# Patient Record
Sex: MALE | URBAN - METROPOLITAN AREA
[De-identification: names, ages, dates, MRNs, and addresses within clinical notes are randomized per-mention and may not be internally consistent; named-entity substitution may affect disease eponyms.]

---

## 2025-02-05 ENCOUNTER — OFFICE VISIT (OUTPATIENT)
Dept: URGENT CARE | Age: 75
End: 2025-02-05
Payer: MEDICARE

## 2025-02-05 VITALS
DIASTOLIC BLOOD PRESSURE: 85 MMHG | RESPIRATION RATE: 16 BRPM | HEART RATE: 49 BPM | TEMPERATURE: 97.9 F | OXYGEN SATURATION: 97 % | SYSTOLIC BLOOD PRESSURE: 149 MMHG

## 2025-02-05 DIAGNOSIS — R05.1 ACUTE COUGH: ICD-10-CM

## 2025-02-05 DIAGNOSIS — Z20.822 SUSPECTED COVID-19 VIRUS INFECTION: ICD-10-CM

## 2025-02-05 DIAGNOSIS — J02.8 PHARYNGITIS DUE TO OTHER ORGANISM: Primary | ICD-10-CM

## 2025-02-05 LAB
POC RAPID INFLUENZA A: NEGATIVE
POC RAPID INFLUENZA B: NEGATIVE
POC SARS-COV-2 AG BINAX: NORMAL

## 2025-02-05 RX ORDER — AZITHROMYCIN 250 MG/1
TABLET, FILM COATED ORAL
Qty: 6 TABLET | Refills: 0 | Status: SHIPPED | OUTPATIENT
Start: 2025-02-05 | End: 2025-02-10

## 2025-02-05 RX ORDER — BENZONATATE 200 MG/1
200 CAPSULE ORAL 3 TIMES DAILY PRN
Qty: 42 CAPSULE | Refills: 0 | Status: SHIPPED | OUTPATIENT
Start: 2025-02-05 | End: 2025-03-07

## 2025-02-05 ASSESSMENT — PATIENT HEALTH QUESTIONNAIRE - PHQ9
2. FEELING DOWN, DEPRESSED OR HOPELESS: NOT AT ALL
1. LITTLE INTEREST OR PLEASURE IN DOING THINGS: NOT AT ALL
SUM OF ALL RESPONSES TO PHQ9 QUESTIONS 1 AND 2: 0

## 2025-02-05 ASSESSMENT — ENCOUNTER SYMPTOMS
DEPRESSION: 0
OCCASIONAL FEELINGS OF UNSTEADINESS: 0
LOSS OF SENSATION IN FEET: 0

## 2025-02-05 NOTE — PROGRESS NOTES
Subjective   Patient ID: Vinay Parekh is a 74 y.o. male. They present today with a chief complaint of Cough (C/O cough for the last three days. ).    History of Present Illness  75 yo male coming in for sore throat and cough for the last 5 days. He states he has been in the hospital with his wife who is having all kinds of tests done and believes he may have picked something up there. He denies any fevers or chills. HE denies any congestion.     Past Medical History  Allergies as of 02/05/2025    (No Known Allergies)       (Not in a hospital admission)       History reviewed. No pertinent past medical history.    Past Surgical History:   Procedure Laterality Date    OTHER SURGICAL HISTORY  08/20/2015    Abdominal Surgery        reports that he has never smoked. He has never used smokeless tobacco.    Review of Systems  Review of Systems:  General: No weight loss, fatigue, anorexia, insomnia, fever, chills.  ENT: Positive pharyngitis, no dry mouth, nasal congestion, ear pain  Cardiac: No chest pain, palpitations, syncope, near syncope.  Pulmonary:  No shortness of breath, positive cough, no hemoptysis  Heme/lymph: No swollen glands, fever, bleeding  GI: No abdominal pain, change in bowel habits, melena, hematemesis, hematochezia, nausea, vomiting, or diarrhea  : No discharge, dysuria, frequency, urgency, hematuria  Skin: No rashes                               Objective    Vitals:    02/05/25 1701   BP: 149/85   BP Location: Left arm   Patient Position: Sitting   BP Cuff Size: Adult   Pulse: (!) 49   Resp: 16   Temp: 36.6 °C (97.9 °F)   TempSrc: Oral   SpO2: 97%     No LMP for male patient.    Physical Exam  Physical Exam:  General: Vital noted, no distress. Afebrile  EENT: Eyes unremarkable, Pupils PERRLA, EOMs intact. TMs unremarkable. Posterior oropharynx with erythema and exudate. Uvula in the midline and non-edematous. No PTA. No retropharyngeal mass. No Dany's angina.  Neck: Supple. No meningismus through  full range of motion. No lymphadenopathy.  Cardiac: Regular rate and rhythm, no murmur  Pulmonary: Lungs clear bilaterally with good aeration. No adventitious breath sounds.      Procedures    Point of Care Test & Imaging Results from this visit  Results for orders placed or performed in visit on 02/05/25   POCT Covid-19 Rapid Antigen   Result Value Ref Range    POC LINDSAY-COV-2 AG  Presumptive negative test for SARS-CoV-2 (no antigen detected)     Presumptive negative test for SARS-CoV-2 (no antigen detected)   POCT Influenza A/B manually resulted   Result Value Ref Range    POC Rapid Influenza A Negative Negative    POC Rapid Influenza B Negative Negative      No results found.    Diagnostic study results (if any) were reviewed by MARTINE Castro.    Assessment/Plan   Allergies, medications, history, and pertinent labs/EKGs/Imaging reviewed by MARTINE Castro.     Medical Decision Making  Testing: rapid covid and flu  Treatment: zithromax and tessalon prescribed  Differential: 1) bronchitis , 2) pharyngitis , 3) covid  Plan: Patient will follow up with the PCP in the next 2-3 days. Return for any worsening symptoms or go to the ER for further evaluation. Patient understands return precautions and discharge insturctions.  Impression:   1) pharyngitis      Orders and Diagnoses  Diagnoses and all orders for this visit:  Pharyngitis due to other organism  -     azithromycin (Zithromax) 250 mg tablet; Take 2 tabs (500 mg) by mouth today, than 1 daily for 4 days.  -     benzonatate (Tessalon) 200 mg capsule; Take 1 capsule (200 mg) by mouth 3 times a day as needed for cough. Do not crush or chew.  Suspected COVID-19 virus infection  -     POCT Covid-19 Rapid Antigen  Acute cough  -     POCT Influenza A/B manually resulted      Medical Admin Record      Patient disposition: Home    Electronically signed by MARTINE Castro  5:30 PM

## 2025-02-11 ENCOUNTER — OFFICE VISIT (OUTPATIENT)
Dept: URGENT CARE | Age: 75
End: 2025-02-11
Payer: MEDICARE

## 2025-02-11 VITALS
TEMPERATURE: 97.9 F | DIASTOLIC BLOOD PRESSURE: 81 MMHG | HEART RATE: 78 BPM | WEIGHT: 185 LBS | OXYGEN SATURATION: 95 % | RESPIRATION RATE: 15 BRPM | SYSTOLIC BLOOD PRESSURE: 124 MMHG

## 2025-02-11 DIAGNOSIS — J40 BRONCHITIS: Primary | ICD-10-CM

## 2025-02-11 RX ORDER — DOXYCYCLINE 100 MG/1
100 CAPSULE ORAL 2 TIMES DAILY
Qty: 14 CAPSULE | Refills: 0 | Status: SHIPPED | OUTPATIENT
Start: 2025-02-11 | End: 2025-02-18

## 2025-02-11 ASSESSMENT — PAIN SCALES - GENERAL: PAINLEVEL_OUTOF10: 0-NO PAIN

## 2025-02-11 NOTE — PROGRESS NOTES
Subjective   Patient ID: Vinay Parekh is a 74 y.o. male. They present today with a chief complaint of Cough (Perisitant with mucus x 1-2 weeks. Seen last week and given meds but pt does not feel they are affective.).    History of Present Illness  73 yo male coming in for cough with yellow/green mucous. HE states he was feeling a little better after taking the Zithromax but then the cough came back again and is now giving him a colored phlegm. He denies any fevers or chills. He denies any shortness of breath.     Past Medical History  Allergies as of 02/11/2025    (No Known Allergies)       (Not in a hospital admission)       No past medical history on file.    Past Surgical History:   Procedure Laterality Date    OTHER SURGICAL HISTORY  08/20/2015    Abdominal Surgery        reports that he has never smoked. He has never used smokeless tobacco.    Review of Systems  Review of Systems:  General: No weight loss, fatigue, anorexia, insomnia, fever, chills.  ENT: No pharyngitis, dry mouth, nasal congestion, ear pain  Cardiac: No chest pain, palpitations, syncope, near syncope.  Pulmonary:  No shortness of breath, positive cough, no hemoptysis  Heme/lymph: No swollen glands, fever, bleeding  Musculoskeletal: No limb pain, joint pain, joint swelling.  Skin: No rashes  Neuro: No numbness, tingling, headaches                                 Objective    Vitals:    02/11/25 0857   BP: 124/81   Pulse: 78   Resp: 15   Temp: 36.6 °C (97.9 °F)   TempSrc: Oral   SpO2: 95%   Weight: 83.9 kg (185 lb)     No LMP for male patient.    Physical Exam  Physical Exam:  General: Vital noted, no distress. Afebrile  EENT: Eyes unremarkable, Pupils PERRLA, EOMs intact. TMs unremarkable. Posterior oropharynx unremarkable. Uvula in the midline and non-edematous. No PTA. No retropharyngeal mass. No Dany's angina.  Cardiac: Regular rate and rhythm, no murmur  Pulmonary: Lungs with diminished breath sounds in the left lower lobe, remainder of  lung fields clear bilaterally with good aeration. No adventitious breath sounds.  Skin: No rashes      Procedures    Point of Care Test & Imaging Results from this visit  No results found for this visit on 02/11/25.   No results found.    Diagnostic study results (if any) were reviewed by [facility].    Assessment/Plan   Allergies, medications, history, and pertinent labs/EKGs/Imaging reviewed by NGOZI Castro-CNP.     Medical Decision Making  Treatment: Doxycycline prescribed  Differential: 1) bronchitis , 2) pneumonia, 3) uri  Plan: Patient will follow up with the PCP in the next 2-3 days. Return for any worsening symptoms or go to the ER for further evaluation. Patient understands return precautions and discharge insturctions.  Impression:   1) bronchitis      Orders and Diagnoses  There are no diagnoses linked to this encounter.    Medical Admin Record      Patient disposition: Home    Electronically signed by [facility]  8:59 AM

## 2025-04-25 ENCOUNTER — OFFICE VISIT (OUTPATIENT)
Dept: URGENT CARE | Age: 75
End: 2025-04-25
Payer: MEDICARE

## 2025-04-25 VITALS
SYSTOLIC BLOOD PRESSURE: 148 MMHG | OXYGEN SATURATION: 92 % | TEMPERATURE: 96.1 F | DIASTOLIC BLOOD PRESSURE: 85 MMHG | HEART RATE: 57 BPM

## 2025-04-25 DIAGNOSIS — J40 BRONCHITIS: Primary | ICD-10-CM

## 2025-04-25 DIAGNOSIS — J02.9 SORE THROAT: ICD-10-CM

## 2025-04-25 LAB
POC HUMAN RHINOVIRUS PCR: NEGATIVE
POC INFLUENZA A VIRUS PCR: NEGATIVE
POC INFLUENZA B VIRUS PCR: NEGATIVE
POC RESPIRATORY SYNCYTIAL VIRUS PCR: NEGATIVE
POC STREPTOCOCCUS PYOGENES (GROUP A STREP) PCR: NEGATIVE

## 2025-04-25 RX ORDER — AMOXICILLIN 875 MG/1
875 TABLET, FILM COATED ORAL 2 TIMES DAILY
Qty: 14 TABLET | Refills: 0 | Status: SHIPPED | OUTPATIENT
Start: 2025-04-25 | End: 2025-05-02

## 2025-04-25 RX ORDER — BENZONATATE 200 MG/1
200 CAPSULE ORAL 3 TIMES DAILY PRN
Qty: 42 CAPSULE | Refills: 0 | Status: SHIPPED | OUTPATIENT
Start: 2025-04-25 | End: 2025-05-25

## 2025-04-25 RX ORDER — ALBUTEROL SULFATE 90 UG/1
2 INHALANT RESPIRATORY (INHALATION) EVERY 4 HOURS PRN
Qty: 8.5 G | Refills: 0 | Status: SHIPPED | OUTPATIENT
Start: 2025-04-25 | End: 2026-04-25

## 2025-04-25 NOTE — PROGRESS NOTES
Subjective   Patient ID: Vinay Parekh is a 75 y.o. male. They present today with a chief complaint of Sore Throat and Cough (Pt states, can't cough up any stuff.).    History of Present Illness  76 yo male coming in for cough and sore throat for the last few days. He states at night the cough is worse. He denies any fevers or chills. He denies any shortness of breath. He states at night he sounds like he is wheezing. He denies any other complaints today.     Past Medical History  Allergies as of 04/25/2025    (No Known Allergies)       Prescriptions Prior to Admission[1]     Medical History[2]    Surgical History[3]     reports that he has never smoked. He has never used smokeless tobacco.    Review of Systems  Review of Systems:  General: No weight loss, fatigue, anorexia, insomnia, fever, chills.  ENT: Positive pharyngitis, no dry mouth, nasal congestion, ear pain  Cardiac: No chest pain, palpitations, syncope, near syncope.  Pulmonary:  No shortness of breath, positive cough, no hemoptysis  Heme/lymph: No swollen glands, fever, bleeding  Musculoskeletal: No limb pain, joint pain, joint swelling.  Skin: No rashes  Neuro: No numbness, tingling, headaches                                 Objective    Vitals:    04/25/25 1550   BP: 148/85   BP Location: Left arm   Patient Position: Sitting   Pulse: 57   Temp: 35.6 °C (96.1 °F)   TempSrc: Oral   SpO2: 92%     No LMP for male patient.    Physical Exam  Physical Exam:  General: Vital noted, no distress. Afebrile  EENT: Eyes unremarkable, Pupils PERRLA, EOMs intact. TMs unremarkable. Posterior oropharynx with erythema. Uvula in the midline and non-edematous. No PTA. No retropharyngeal mass. No Dany's angina.  Cardiac: Regular rate and rhythm, no murmur  Pulmonary: Lungs clear bilaterally with good aeration. No adventitious breath sounds.  Skin: No rashes    Procedures    Point of Care Test & Imaging Results from this visit  No results found for this visit on 04/25/25.    Imaging  No results found.    Cardiology, Vascular, and Other Imaging  No other imaging results found for the past 2 days      Diagnostic study results (if any) were reviewed by MARTINE Castro.    Assessment/Plan   Allergies, medications, history, and pertinent labs/EKGs/Imaging reviewed by MARTINE Castro.     Medical Decision Making  Testing: [place]  Treatment: Amoxicillin and albuterol prescribed  Differential: 1) rhinovirus , 2) strep , 3) bronchitis   Plan: Patient will follow up with the PCP in the next 2-3 days. Return for any worsening symptoms or go to the ER for further evaluation. Patient understands return precautions and discharge insturctions.  Impression:   1) bronchitis      Orders and Diagnoses  Diagnoses and all orders for this visit:  Bronchitis  -     amoxicillin (Amoxil) 875 mg tablet; Take 1 tablet (875 mg) by mouth 2 times a day for 7 days.  -     albuterol (ProAir HFA) 90 mcg/actuation inhaler; Inhale 2 puffs every 4 hours if needed for wheezing or shortness of breath.  -     benzonatate (Tessalon) 200 mg capsule; Take 1 capsule (200 mg) by mouth 3 times a day as needed for cough. Do not crush or chew.  Sore throat  -     POCT SPOTFIRE R/ST Panel Mini w/Strep A ([facility]) manually resulted      Medical Admin Record      Patient disposition: Home    Electronically signed by MARTINE Castro  4:24 PM           [1] (Not in a hospital admission)   [2] No past medical history on file.  [3]   Past Surgical History:  Procedure Laterality Date    OTHER SURGICAL HISTORY  08/20/2015    Abdominal Surgery

## 2025-04-28 ENCOUNTER — OFFICE VISIT (OUTPATIENT)
Dept: URGENT CARE | Age: 75
End: 2025-04-28
Payer: MEDICARE

## 2025-04-28 ENCOUNTER — ANCILLARY PROCEDURE (OUTPATIENT)
Dept: URGENT CARE | Age: 75
End: 2025-04-28
Payer: MEDICARE

## 2025-04-28 VITALS
HEART RATE: 100 BPM | SYSTOLIC BLOOD PRESSURE: 132 MMHG | TEMPERATURE: 99.9 F | OXYGEN SATURATION: 92 % | RESPIRATION RATE: 22 BRPM | WEIGHT: 194 LBS | DIASTOLIC BLOOD PRESSURE: 74 MMHG

## 2025-04-28 DIAGNOSIS — R06.2 WHEEZING: Primary | ICD-10-CM

## 2025-04-28 DIAGNOSIS — Z20.822 SUSPECTED 2019-NCOV INFECTION: ICD-10-CM

## 2025-04-28 DIAGNOSIS — R05.9 COUGH, UNSPECIFIED TYPE: ICD-10-CM

## 2025-04-28 DIAGNOSIS — J22 LOWER RESPIRATORY TRACT INFECTION: ICD-10-CM

## 2025-04-28 DIAGNOSIS — R06.2 WHEEZING: ICD-10-CM

## 2025-04-28 LAB
POC CORONAVIRUS SARS-COV-2 PCR: NEGATIVE
POC HUMAN RHINOVIRUS PCR: NEGATIVE
POC INFLUENZA A VIRUS PCR: NEGATIVE
POC INFLUENZA B VIRUS PCR: NEGATIVE
POC RESPIRATORY SYNCYTIAL VIRUS PCR: NEGATIVE

## 2025-04-28 PROCEDURE — 71046 X-RAY EXAM CHEST 2 VIEWS: CPT

## 2025-04-28 PROCEDURE — 1160F RVW MEDS BY RX/DR IN RCRD: CPT

## 2025-04-28 PROCEDURE — 87631 RESP VIRUS 3-5 TARGETS: CPT

## 2025-04-28 PROCEDURE — 99214 OFFICE O/P EST MOD 30 MIN: CPT

## 2025-04-28 PROCEDURE — 1159F MED LIST DOCD IN RCRD: CPT

## 2025-04-28 PROCEDURE — 1036F TOBACCO NON-USER: CPT

## 2025-04-28 RX ORDER — METHYLPREDNISOLONE 4 MG/1
TABLET ORAL
Qty: 21 TABLET | Refills: 0 | Status: SHIPPED | OUTPATIENT
Start: 2025-04-28 | End: 2025-05-04

## 2025-04-28 RX ORDER — AZITHROMYCIN 250 MG/1
TABLET, FILM COATED ORAL
Qty: 6 TABLET | Refills: 0 | Status: SHIPPED | OUTPATIENT
Start: 2025-04-28 | End: 2025-05-03

## 2025-04-28 ASSESSMENT — ENCOUNTER SYMPTOMS
LOSS OF SENSATION IN FEET: 0
DEPRESSION: 0
OCCASIONAL FEELINGS OF UNSTEADINESS: 0

## 2025-04-28 NOTE — PROGRESS NOTES
Subjective   Patient ID: Vinay Parekh is a 75 y.o. male. They present today with a chief complaint of cough, chest congestion, nasal congestion for over a week.  Patient was seen last Friday and was diagnosed with bronchitis and he was started on albuterol inhaler, amoxicillin, benzonatate.  Patient states that his chest symptoms has not improved and is getting worse.  He denies chest pain, shortness of breath, palpitations, dizziness.      Past Medical History  Allergies as of 04/28/2025    (No Known Allergies)       Prescriptions Prior to Admission[1]     Medical History[2]    Surgical History[3]     reports that he has never smoked. He has never used smokeless tobacco. Drug use questions deferred to the physician. He reports that he does not drink alcohol.    Review of Systems  Review of Systems                               Objective    Vitals:    04/28/25 0828   BP: 132/74   Pulse: 100   Resp: 22   Temp: 37.7 °C (99.9 °F)   SpO2: 92%   Weight: 88 kg (194 lb)     No LMP for male patient.    Physical Exam  Vitals reviewed.   Constitutional:       General: He is not in acute distress.  HENT:      Head: Normocephalic and atraumatic.      Right Ear: Tympanic membrane and ear canal normal. No tenderness.      Left Ear: Tympanic membrane and ear canal normal. No tenderness.      Nose: Congestion present.      Mouth/Throat:      Mouth: Mucous membranes are moist.      Pharynx: Oropharynx is clear. Uvula midline. No pharyngeal swelling, oropharyngeal exudate or posterior oropharyngeal erythema.   Eyes:      Extraocular Movements: Extraocular movements intact.      Conjunctiva/sclera: Conjunctivae normal.      Pupils: Pupils are equal, round, and reactive to light.   Cardiovascular:      Rate and Rhythm: Normal rate and regular rhythm.      Heart sounds: No murmur heard.  Pulmonary:      Effort: Pulmonary effort is normal.      Breath sounds: Wheezing present.   Skin:     General: Skin is warm.   Neurological:       Mental Status: He is alert and oriented to person, place, and time.   Psychiatric:         Mood and Affect: Mood normal.         Behavior: Behavior normal.             Point of Care Test & Imaging Results from this visit  Results for orders placed or performed in visit on 04/28/25   POCT SPOTFIRE R/ST Panel Mini w/COVID ([facility]) manually resulted    Specimen: Swab   Result Value Ref Range    POC Sars-Cov-2 PCR Negative Negative    POC Respiratory Syncytial Virus PCR Negative Negative    POC Influenza A Virus PCR Negative Negative    POC Influenza B Virus PCR Negative Negative    POC Human Rhinovirus PCR Negative Negative      Imaging  XR chest 2 views  Result Date: 4/28/2025  1.  No evidence of acute cardiopulmonary process.       MACRO: None   Signed by: Trudy Whitfield 4/28/2025 10:49 AM Dictation workstation:   [id number]      Cardiology, Vascular, and Other Imaging  No other imaging results found for the past 2 days      Diagnostic study results (if any) were reviewed by Wilfred Morrison PA-C.    Assessment/Plan   Allergies, medications, history, and pertinent labs/EKGs/Imaging reviewed by Wilfred Morrison PA-C.     Medical Decision Making  Chest x-ray is normal.  Negative COVID, flu, RSV, rhinovirus. Will start on medrol dose pack and azithromycin.  -         Patient is educated about their diagnoses.     -          Discussed medications benefits and adverse effects.     -          Answered all patient’s questions.     -          Patient will call 911 or go to the nearest ED if worsen symptoms .     -          Patient is agreeable to the plan of care and is deemed stable upon discharge.     -          Follow up with your primary care provider in two days.    Orders and Diagnoses  Diagnoses and all orders for this visit:  Wheezing  -     XR chest 2 views; Future  -     methylPREDNISolone (Medrol Dospak) 4 mg tablets; Follow schedule on package instructions  Cough, unspecified type  -     POCT SPOTFIRE R/ST Panel  Mini w/COVID ([facility]) manually resulted  -     XR chest 2 views; Future  Suspected 2019-nCoV infection  Lower respiratory tract infection  -     azithromycin (Zithromax) 250 mg tablet; Take 2 tabs (500 mg) by mouth today, than 1 daily for 4 days.      Medical Admin Record      Patient disposition: Home    Electronically signed by Wilfred Morrison PA-C  11:00 AM           [1] (Not in a hospital admission)   [2] History reviewed. No pertinent past medical history.  [3]   Past Surgical History:  Procedure Laterality Date    OTHER SURGICAL HISTORY  08/20/2015    Abdominal Surgery